# Patient Record
Sex: MALE | Race: WHITE
[De-identification: names, ages, dates, MRNs, and addresses within clinical notes are randomized per-mention and may not be internally consistent; named-entity substitution may affect disease eponyms.]

---

## 2018-09-16 ENCOUNTER — HOSPITAL ENCOUNTER (OUTPATIENT)
Dept: HOSPITAL 56 - MW.ED | Age: 58
Setting detail: OBSERVATION
Discharge: HOME | End: 2018-09-16
Attending: FAMILY MEDICINE | Admitting: FAMILY MEDICINE
Payer: COMMERCIAL

## 2018-09-16 DIAGNOSIS — F17.210: ICD-10-CM

## 2018-09-16 DIAGNOSIS — I10: Primary | ICD-10-CM

## 2018-09-16 DIAGNOSIS — Z79.899: ICD-10-CM

## 2018-09-16 DIAGNOSIS — R07.89: ICD-10-CM

## 2018-09-16 LAB
CHLORIDE SERPL-SCNC: 101 MMOL/L (ref 98–107)
SODIUM SERPL-SCNC: 138 MMOL/L (ref 136–148)

## 2018-09-16 PROCEDURE — 94640 AIRWAY INHALATION TREATMENT: CPT

## 2018-09-16 PROCEDURE — 82150 ASSAY OF AMYLASE: CPT

## 2018-09-16 PROCEDURE — G0378 HOSPITAL OBSERVATION PER HR: HCPCS

## 2018-09-16 PROCEDURE — 80053 COMPREHEN METABOLIC PANEL: CPT

## 2018-09-16 PROCEDURE — 84484 ASSAY OF TROPONIN QUANT: CPT

## 2018-09-16 PROCEDURE — 83735 ASSAY OF MAGNESIUM: CPT

## 2018-09-16 PROCEDURE — 96374 THER/PROPH/DIAG INJ IV PUSH: CPT

## 2018-09-16 PROCEDURE — 36415 COLL VENOUS BLD VENIPUNCTURE: CPT

## 2018-09-16 PROCEDURE — 96361 HYDRATE IV INFUSION ADD-ON: CPT

## 2018-09-16 PROCEDURE — 99285 EMERGENCY DEPT VISIT HI MDM: CPT

## 2018-09-16 PROCEDURE — 93005 ELECTROCARDIOGRAM TRACING: CPT

## 2018-09-16 PROCEDURE — 85610 PROTHROMBIN TIME: CPT

## 2018-09-16 PROCEDURE — 85025 COMPLETE CBC W/AUTO DIFF WBC: CPT

## 2018-09-16 PROCEDURE — 96375 TX/PRO/DX INJ NEW DRUG ADDON: CPT

## 2018-09-16 PROCEDURE — C9113 INJ PANTOPRAZOLE SODIUM, VIA: HCPCS

## 2018-09-16 PROCEDURE — G0480 DRUG TEST DEF 1-7 CLASSES: HCPCS

## 2018-09-16 PROCEDURE — 83690 ASSAY OF LIPASE: CPT

## 2018-09-16 PROCEDURE — 71045 X-RAY EXAM CHEST 1 VIEW: CPT

## 2018-09-16 NOTE — PCM.HP
H&P History of Present Illness





- General


Date of Service: 09/16/18


Admit Problem/Dx: 


 Admission Diagnosis/Problem





Admission Diagnosis/Problem      Dyspnea











- History of Present Illness


Initial Comments - Free Text/Narative: 





He was seen in the ED with cough and some chest pain. He states that he is much 

better now


EKG in the ED showed sinus rhythm with RBBB with T wave flattening in lead II 

and with flipped T waves in III and AVF





He wants to go home.





  ** head


Pain Score (Numeric/FACES): 6





- Related Data


Allergies/Adverse Reactions: 


 Allergies











Allergy/AdvReac Type Severity Reaction Status Date / Time


 


No Known Allergies Allergy   Verified 09/16/18 01:49











Home Medications: 


 Home Meds





Hydrochlorothiazide [Microzide] 0 mg PO DAILY 09/16/18 [History]


Hydrocodone/Acetaminophen [Hydrocodon-Acetaminoph 7.5-325] 0 mg PO ASDIRECTED 09 /16/18 [History]


Lisinopril 20 mg PO DAILY 09/16/18 [History]


Metoprolol Succinate [Kapspargo Sprinkle] 100 mg PO DAILY 09/16/18 [History]











Past Medical History


Cardiovascular History: Reports: Hypertension.  Denies: Afib, Cardiomyopathy, MI


Respiratory History: Denies: Asthma, COPD


Musculoskeletal History: Reports: Back Pain, Chronic


Endocrine/Metabolic History: Denies: Levy's Disease, Diabetes, Type I, 

Diabetes, Type II


Oncologic (Cancer) History: Reports: None





Social & Family History





- Family History


Family Medical History: Noncontributory





- Tobacco Use


Smoking Status *Q: Current Every Day Smoker


Years of Tobacco use: 45


Packs/Tins Daily: 1.5





- Caffeine Use


Caffeine Use: Reports: Coffee





- Recreational Drug Use


Recreational Drug Use: No





H&P Review of Systems





- Review of Systems:


Review Of Systems: See Below


Pulmonary: Reports: Shortness of Breath, Cough


Cardiovascular: Reports: Chest Pain


Gastrointestinal: Denies: Bloody Stool, Hematemesis


Genitourinary: Denies: Hematuria





Exam





- Exam


Exam: See Below





- Vital Signs


Vital Signs: 


 Last Vital Signs











Temp  97.4 F   09/16/18 11:30


 


Pulse  97   09/16/18 11:30


 


Resp  16   09/16/18 11:30


 


BP  171/95 H  09/16/18 11:30


 


Pulse Ox  97   09/16/18 11:30











Weight: 143.335 kg





- Exam


General: Alert, Oriented


HEENT: Conjunctiva Clear


Neck: Trachea Midline


Lungs: Clear to Auscultation, Normal Respiratory Effort


Cardiovascular: Regular Rate, Regular Rhythm


GI/Abdominal Exam: Soft


Rectal (Males) Exam: Deferred


Extremities: No Pedal Edema


Neurological: Cranial Nerves Intact, Normal Speech





- Patient Data


Lab Results Last 24 hrs: 


 Laboratory Results - last 24 hr











  09/16/18 09/16/18 09/16/18 Range/Units





  01:45 01:45 01:45 


 


WBC  6.34    (4.0-11.0)  K/uL


 


RBC  5.15    (4.50-5.90)  M/uL


 


Hgb  15.8    (13.0-17.0)  g/dL


 


Hct  44.5    (38.0-50.0)  %


 


MCV  86.4    (80.0-98.0)  fL


 


MCH  30.7    (27.0-32.0)  pg


 


MCHC  35.5    (31.0-37.0)  g/dL


 


RDW Std Deviation  39.7    (28.0-62.0)  fl


 


RDW Coeff of Josefa  13    (11.0-15.0)  %


 


Plt Count  226    (150-400)  K/uL


 


MPV  10.00    (7.40-12.00)  fL


 


Neut % (Auto)  49.5    (48.0-80.0)  %


 


Lymph % (Auto)  34.5    (16.0-40.0)  %


 


Mono % (Auto)  10.6    (0.0-15.0)  %


 


Eos % (Auto)  4.9    (0.0-7.0)  %


 


Baso % (Auto)  0.5    (0.0-1.5)  %


 


Neut # (Auto)  3.1    (1.4-5.7)  K/uL


 


Lymph # (Auto)  2.2    (0.6-2.4)  K/uL


 


Mono # (Auto)  0.7    (0.0-0.8)  K/uL


 


Eos # (Auto)  0.3    (0.0-0.7)  K/uL


 


Baso # (Auto)  0.0    (0.0-0.1)  K/uL


 


INR   0.98   


 


Sodium    138  (136-148)  mmol/L


 


Potassium    4.1  (3.5-5.1)  mmol/L


 


Chloride    101  ()  mmol/L


 


Carbon Dioxide    27.7  (21.0-32.0)  mmol/L


 


BUN    11  (7.0-18.0)  mg/dL


 


Creatinine    1.0  (0.8-1.3)  mg/dL


 


Est Cr Clr Drug Dosing    97.41  mL/min


 


Estimated GFR (MDRD)    > 60.0  ml/min


 


Glucose    136 H  ()  mg/dL


 


Calcium    9.1  (8.5-10.1)  mg/dL


 


Magnesium     (1.8-2.4)  mg/dL


 


Total Bilirubin    0.4  (0.2-1.0)  mg/dL


 


AST    17  (15-37)  IU/L


 


ALT    34  (14-63)  IU/L


 


Alkaline Phosphatase    86  ()  U/L


 


Troponin I    < 0.050  (0.000-0.056)  ng/mL


 


Total Protein    7.4  (6.4-8.2)  g/dL


 


Albumin    4.1  (3.4-5.0)  g/dL


 


Globulin    3.3  (2.0-3.5)  g/dL


 


Albumin/Globulin Ratio    1.2 L  (1.3-2.8)  


 


Amylase    42  ()  U/L


 


Lipase    148  ()  U/L


 


Ethyl Alcohol     mg/dL














  09/16/18 09/16/18 Range/Units





  01:45 07:50 


 


WBC    (4.0-11.0)  K/uL


 


RBC    (4.50-5.90)  M/uL


 


Hgb    (13.0-17.0)  g/dL


 


Hct    (38.0-50.0)  %


 


MCV    (80.0-98.0)  fL


 


MCH    (27.0-32.0)  pg


 


MCHC    (31.0-37.0)  g/dL


 


RDW Std Deviation    (28.0-62.0)  fl


 


RDW Coeff of Josefa    (11.0-15.0)  %


 


Plt Count    (150-400)  K/uL


 


MPV    (7.40-12.00)  fL


 


Neut % (Auto)    (48.0-80.0)  %


 


Lymph % (Auto)    (16.0-40.0)  %


 


Mono % (Auto)    (0.0-15.0)  %


 


Eos % (Auto)    (0.0-7.0)  %


 


Baso % (Auto)    (0.0-1.5)  %


 


Neut # (Auto)    (1.4-5.7)  K/uL


 


Lymph # (Auto)    (0.6-2.4)  K/uL


 


Mono # (Auto)    (0.0-0.8)  K/uL


 


Eos # (Auto)    (0.0-0.7)  K/uL


 


Baso # (Auto)    (0.0-0.1)  K/uL


 


INR    


 


Sodium    (136-148)  mmol/L


 


Potassium    (3.5-5.1)  mmol/L


 


Chloride    ()  mmol/L


 


Carbon Dioxide    (21.0-32.0)  mmol/L


 


BUN    (7.0-18.0)  mg/dL


 


Creatinine    (0.8-1.3)  mg/dL


 


Est Cr Clr Drug Dosing    mL/min


 


Estimated GFR (MDRD)    ml/min


 


Glucose    ()  mg/dL


 


Calcium    (8.5-10.1)  mg/dL


 


Magnesium  1.8   (1.8-2.4)  mg/dL


 


Total Bilirubin    (0.2-1.0)  mg/dL


 


AST    (15-37)  IU/L


 


ALT    (14-63)  IU/L


 


Alkaline Phosphatase    ()  U/L


 


Troponin I   < 0.050  (0.000-0.056)  ng/mL


 


Total Protein    (6.4-8.2)  g/dL


 


Albumin    (3.4-5.0)  g/dL


 


Globulin    (2.0-3.5)  g/dL


 


Albumin/Globulin Ratio    (1.3-2.8)  


 


Amylase    ()  U/L


 


Lipase    ()  U/L


 


Ethyl Alcohol  26   mg/dL











Result Diagrams: 


 09/16/18 01:45





 09/16/18 01:45





- Problem List


(1) Poorly-controlled hypertension


SNOMED Code(s): 675431798


   ICD Code: I10 - ESSENTIAL (PRIMARY) HYPERTENSION   Status: Acute   Current 

Visit: No   


Problem List Initiated/Reviewed/Updated: Yes


Orders Last 24hrs: 


 Active Orders 24 hr











 Category Date Time Status


 


 Patient Status [ADT] Stat ADT  09/16/18 03:21 Active


 


 Cardiac Monitoring [RC] Q8H Care  09/16/18 01:20 Active


 


 EKG Documentation Completion [RC] STAT Care  09/16/18 01:20 Active


 


 Oxygen Therapy, ED [RC] ASDIRECTED Care  09/16/18 01:20 Active


 


 Pulse Oximetry [RC] ASDIRECTED Care  09/16/18 01:20 Active


 


 RT Aerosol Therapy [RC] ASDIRECTED Care  09/16/18 01:43 Active


 


 2 Gram Sodium Diet [DIET] Diet  09/16/18 Breakfast Active


 


 Chest 1V Frontal [CR] Stat Exams  09/16/18 01:43 Taken


 


 TROPONIN I [CHEM] Routine Lab  09/16/18 13:45 Ordered


 


 Lisinopril [Prinivil] Med  09/16/18 10:30 Active





 20 mg PO DAILY   


 


 Metoprolol Succinate [Toprol XL] Med  09/16/18 10:15 Active





 100 mg PO DAILY   


 


 Sodium Chloride 0.9% [Saline Flush] Med  09/16/18 01:19 Active





 10 ml FLUSH ASDIRECTED PRN   


 


 Sodium Chloride 0.9% [Saline Flush] Med  09/16/18 01:19 Active





 2.5 ml FLUSH ASDIRECTED PRN   


 


 Saline Lock Insert [OM.PC] Stat Oth  09/16/18 01:20 Ordered








 Medication Orders





Lisinopril (Prinivil)  20 mg PO DAILY [facility]


   Last Admin: 09/16/18 10:27  Dose: 20 mg


Metoprolol Succinate (Toprol Xl)  100 mg PO DAILY [facility]


   Last Admin: 09/16/18 10:19  Dose: 100 mg


Sodium Chloride (Saline Flush)  10 ml FLUSH ASDIRECTED PRN


   PRN Reason: Keep Vein Open


Sodium Chloride (Saline Flush)  2.5 ml FLUSH ASDIRECTED PRN


   PRN Reason: Keep Vein Open








Assessment/Plan Comment:: 





observe 


telemetry

## 2018-09-16 NOTE — EDM.PDOC
ED HPI GENERAL MEDICAL PROBLEM





- General


Chief Complaint: Cardiovascular Problem


Stated Complaint: SHORTNESS OF BREATH


Time Seen by Provider: 09/16/18 01:36





- History of Present Illness


INITIAL COMMENTS - FREE TEXT/NARRATIVE: 





HISTORY AND PHYSICAL:





History of present illness:


The patient is a 58-year-old male with a history of hypertension as well as a 

long-standing smoking history, 1-1/2 packs per day for 45 years, but has no 

stated pulmonary history and presents with complaints of shortness of breath 

that has been ongoing all day yesterday and worsened this evening. The patient 

denies any fevers chills or upper respiratory symptoms prior to  today but says 

that all day he felt like he was short of breath with activities and with rest 

but he didn't have cough or any other symptoms. [place] and is not exposed to any chemicals or other airborne products that he 

is aware of. He says he usually lives in [place] and has been here over 2 

weeks. He says that when he got home after working he still felt short of 

breath does he did the day but tried to go to sleep and then at 10 PM he 

started having a dry hacking cough intermittently and more shortness of breath 

but no chest pain. He says that he decided to come in because about an hour ago 

he started having some indigestion which he doesn't normally have. He has no 

abdominal pain and he has no vomiting or nausea and she's had no diarrhea or 

black or bloody stools. Patient denies any GI history. Currently in the ED he 

feels that he can take a deep breath but he says he just feels like he is not 

getting enough oxygen. He again denies any chest pain no extremity pain no leg 

swelling and no history of congestive heart failure. He has no other cardiac 

history. He did not take any medications for shortness of breath or cough prior 

to coming here.


The patient tells me that when he sees his provider back at home for his blood 

pressure a good blood pressure for him is 160s over 90s. He doesn't usually get 

below that. He has no headache neck pain or back pain and has had no recent 

trauma.


Patient told nursing that he took his blood pressure at home and it was 190/114 

and he was concerned about that along with a dull diffuse headache. On my 

evaluation he says that he has a headache but he points to the top of his head 

and is not lateralizing right or left and it is diffuse.


Review of systems: 


As per history of present illness and below otherwise all systems reviewed and 

negative.





Past medical history: 


As per history of present illness and as reviewed below otherwise 

noncontributory.





Surgical history: 


As per history of present illness and as reviewed below otherwise 

noncontributory.





Social history: 


No reported history of drug or alcohol abuse.





Family history: 


As per history of present illness and as reviewed below otherwise 

noncontributory.





Physical exam:


General: Well-developed well-nourished mildly overweight man who is nontoxic 

and vital signs are reviewed by me. I did hear a hacking dry cough on my 

evaluation. Is not breathless with speech.


HEENT: Atraumatic, normocephalic, pupils reactive, negative for conjunctival 

pallor or scleral icterus, mucous membranes moist, throat clear, neck supple, 

nontender, trachea midline.


Lungs: Clear to auscultation, breath sounds equal bilaterally, chest nontender. 

There is no wheezing or stridor but there is some diminished breath sounds in 

the bases bilaterally and no work of breathing.


Heart: S1S2, regular rate and rhythm rhythm no overt murmurs


Abdomen: Soft, nondistended, nontender. Negative for masses or 

hepatosplenomegaly. NABS


Pelvis: Stable nontender.


Genitourinary: Deferred.


Rectal: Deferred.


Extremities: Atraumatic, negative for cords or calf pain. Neurovascular 

unremarkable. No pedal edema or leg asymmetry


Neuro: Awake, alert, oriented. Cranial nerves II through XII unremarkable. 

Cerebellum unremarkable. Motor and sensory unremarkable throughout. Exam 

nonfocal.


Skin: There is normal turgor and no evidence of any diaphoresis


Diagnostics:


EKG--nonspecific T-wave inversion in 2 of the inferior leads otherwise no acute 

changes


Portable chest x-ray CBC CMP amylase lipase INR troponin magnesium alcohol level





Therapeutics:


IV O2 monitor IV fluids duo neb Solu-Medrol aspirin nitro paste Protonix Tylenol





0250: Patient is most recent blood pressure is 144/78.


0305a: He says he is feeling much improved with all the medications and I've 

given him and I discussed with him all testing results and my recommendation 

for observation admission for the oddness of the symptoms without any 

definitive etiology and his episode of heartburn and hypertension. He is 

agreeable and I will discuss this case with the hospitalist Dr Gorman


0325: Case discussed with Dr. Gorman who accepts the patient for admission.


Impression: 


Dyspnea, hypertension poorly controlled, atypical chest pain rule out anginal 

equivalent





Definitive disposition and diagnosis as appropriate pending reevaluation and 

review of above.


  ** head


Pain Score (Numeric/FACES): 6





- Related Data


 Allergies











Allergy/AdvReac Type Severity Reaction Status Date / Time


 


No Known Allergies Allergy   Verified 09/16/18 01:49











Home Meds: 


 Home Meds





Hydrochlorothiazide [Microzide] 0 mg PO DAILY 09/16/18 [History]


Hydrocodone/Acetaminophen [Hydrocodon-Acetaminoph 7.5-325] 0 mg PO ASDIRECTED 09 /16/18 [History]


Lisinopril 1 tab PO DAILY 09/16/18 [History]


Metoprolol Succinate [Kapspargo Sprinkle] 1 tab PO DAILY 09/16/18 [History]











ED ROS GENERAL





- Review of Systems


Review Of Systems: ROS reveals no pertinent complaints other than HPI.





ED EXAM, GENERAL





- Physical Exam


Exam: See Below (See dictation)





Course





- Vital Signs


Last Recorded V/S: 


 Last Vital Signs











Temp  36.7 C   09/16/18 03:09


 


Pulse  66   09/16/18 03:09


 


Resp  18   09/16/18 03:09


 


BP  157/84 H  09/16/18 03:09


 


Pulse Ox  97   09/16/18 03:09














- Orders/Labs/Meds


Orders: 


 Active Orders 24 hr











 Category Date Time Status


 


 Patient Status [ADT] Stat ADT  09/16/18 03:21 Active


 


 Cardiac Monitoring [RC] .AS DIRECTED Care  09/16/18 01:20 Active


 


 EKG Documentation Completion [RC] STAT Care  09/16/18 01:20 Active


 


 Oxygen Therapy, ED [RC] ASDIRECTED Care  09/16/18 01:20 Active


 


 Pulse Oximetry [RC] ASDIRECTED Care  09/16/18 01:20 Active


 


 RT Aerosol Therapy [RC] ASDIRECTED Care  09/16/18 01:43 Active


 


 Chest 1V Frontal [CR] Stat Exams  09/16/18 01:43 Taken


 


 Sodium Chloride 0.9% [Saline Flush] Med  09/16/18 01:19 Active





 10 ml FLUSH ASDIRECTED PRN   


 


 Sodium Chloride 0.9% [Saline Flush] Med  09/16/18 01:19 Active





 2.5 ml FLUSH ASDIRECTED PRN   


 


 Saline Lock Insert [OM.PC] Stat Oth  09/16/18 01:20 Ordered








 Medication Orders





Sodium Chloride (Saline Flush)  10 ml FLUSH ASDIRECTED PRN


   PRN Reason: Keep Vein Open


Sodium Chloride (Saline Flush)  2.5 ml FLUSH ASDIRECTED PRN


   PRN Reason: Keep Vein Open








Labs: 


 Laboratory Tests











  09/16/18 09/16/18 09/16/18 Range/Units





  01:45 01:45 01:45 


 


WBC  6.34    (4.0-11.0)  K/uL


 


RBC  5.15    (4.50-5.90)  M/uL


 


Hgb  15.8    (13.0-17.0)  g/dL


 


Hct  44.5    (38.0-50.0)  %


 


MCV  86.4    (80.0-98.0)  fL


 


MCH  30.7    (27.0-32.0)  pg


 


MCHC  35.5    (31.0-37.0)  g/dL


 


RDW Std Deviation  39.7    (28.0-62.0)  fl


 


RDW Coeff of Josefa  13    (11.0-15.0)  %


 


Plt Count  226    (150-400)  K/uL


 


MPV  10.00    (7.40-12.00)  fL


 


Neut % (Auto)  49.5    (48.0-80.0)  %


 


Lymph % (Auto)  34.5    (16.0-40.0)  %


 


Mono % (Auto)  10.6    (0.0-15.0)  %


 


Eos % (Auto)  4.9    (0.0-7.0)  %


 


Baso % (Auto)  0.5    (0.0-1.5)  %


 


Neut # (Auto)  3.1    (1.4-5.7)  K/uL


 


Lymph # (Auto)  2.2    (0.6-2.4)  K/uL


 


Mono # (Auto)  0.7    (0.0-0.8)  K/uL


 


Eos # (Auto)  0.3    (0.0-0.7)  K/uL


 


Baso # (Auto)  0.0    (0.0-0.1)  K/uL


 


INR   0.98   


 


Sodium    138  (136-148)  mmol/L


 


Potassium    4.1  (3.5-5.1)  mmol/L


 


Chloride    101  ()  mmol/L


 


Carbon Dioxide    27.7  (21.0-32.0)  mmol/L


 


BUN    11  (7.0-18.0)  mg/dL


 


Creatinine    1.0  (0.8-1.3)  mg/dL


 


Est Cr Clr Drug Dosing    97.41  mL/min


 


Estimated GFR (MDRD)    > 60.0  ml/min


 


Glucose    136 H  ()  mg/dL


 


Calcium    9.1  (8.5-10.1)  mg/dL


 


Magnesium     (1.8-2.4)  mg/dL


 


Total Bilirubin    0.4  (0.2-1.0)  mg/dL


 


AST    17  (15-37)  IU/L


 


ALT    34  (14-63)  IU/L


 


Alkaline Phosphatase    86  ()  U/L


 


Troponin I    < 0.050  (0.000-0.056)  ng/mL


 


Total Protein    7.4  (6.4-8.2)  g/dL


 


Albumin    4.1  (3.4-5.0)  g/dL


 


Globulin    3.3  (2.0-3.5)  g/dL


 


Albumin/Globulin Ratio    1.2 L  (1.3-2.8)  


 


Amylase    42  ()  U/L


 


Lipase    148  ()  U/L


 


Ethyl Alcohol     mg/dL














  09/16/18 Range/Units





  01:45 


 


WBC   (4.0-11.0)  K/uL


 


RBC   (4.50-5.90)  M/uL


 


Hgb   (13.0-17.0)  g/dL


 


Hct   (38.0-50.0)  %


 


MCV   (80.0-98.0)  fL


 


MCH   (27.0-32.0)  pg


 


MCHC   (31.0-37.0)  g/dL


 


RDW Std Deviation   (28.0-62.0)  fl


 


RDW Coeff of Josefa   (11.0-15.0)  %


 


Plt Count   (150-400)  K/uL


 


MPV   (7.40-12.00)  fL


 


Neut % (Auto)   (48.0-80.0)  %


 


Lymph % (Auto)   (16.0-40.0)  %


 


Mono % (Auto)   (0.0-15.0)  %


 


Eos % (Auto)   (0.0-7.0)  %


 


Baso % (Auto)   (0.0-1.5)  %


 


Neut # (Auto)   (1.4-5.7)  K/uL


 


Lymph # (Auto)   (0.6-2.4)  K/uL


 


Mono # (Auto)   (0.0-0.8)  K/uL


 


Eos # (Auto)   (0.0-0.7)  K/uL


 


Baso # (Auto)   (0.0-0.1)  K/uL


 


INR   


 


Sodium   (136-148)  mmol/L


 


Potassium   (3.5-5.1)  mmol/L


 


Chloride   ()  mmol/L


 


Carbon Dioxide   (21.0-32.0)  mmol/L


 


BUN   (7.0-18.0)  mg/dL


 


Creatinine   (0.8-1.3)  mg/dL


 


Est Cr Clr Drug Dosing   mL/min


 


Estimated GFR (MDRD)   ml/min


 


Glucose   ()  mg/dL


 


Calcium   (8.5-10.1)  mg/dL


 


Magnesium  1.8  (1.8-2.4)  mg/dL


 


Total Bilirubin   (0.2-1.0)  mg/dL


 


AST   (15-37)  IU/L


 


ALT   (14-63)  IU/L


 


Alkaline Phosphatase   ()  U/L


 


Troponin I   (0.000-0.056)  ng/mL


 


Total Protein   (6.4-8.2)  g/dL


 


Albumin   (3.4-5.0)  g/dL


 


Globulin   (2.0-3.5)  g/dL


 


Albumin/Globulin Ratio   (1.3-2.8)  


 


Amylase   ()  U/L


 


Lipase   ()  U/L


 


Ethyl Alcohol  26  mg/dL











Meds: 


Medications











Generic Name Dose Route Start Last Admin





  Trade Name Freq  PRN Reason Stop Dose Admin


 


Sodium Chloride  10 ml  09/16/18 01:19  





  Saline Flush  FLUSH   





  ASDIRECTED PRN   





  Keep Vein Open   





     





     





     


 


Sodium Chloride  2.5 ml  09/16/18 01:19  





  Saline Flush  FLUSH   





  ASDIRECTED PRN   





  Keep Vein Open   





     





     





     














Discontinued Medications














Generic Name Dose Route Start Last Admin





  Trade Name Freq  PRN Reason Stop Dose Admin


 


Acetaminophen  1,000 mg  09/16/18 02:04  09/16/18 02:12





  Tylenol Extra Strength  PO  09/16/18 02:05  1,000 mg





  ONETIME ONE   Administration





     





     





     





     


 


Albuterol/Ipratropium  3 ml  09/16/18 01:42  09/16/18 01:55





  Duoneb 3.0-0.5 Mg/3 Ml  NEB  09/16/18 01:43  3 ml





  ONETIME ONE   Administration





     





     





     





     


 


Aspirin  324 mg  09/16/18 01:42  09/16/18 01:54





  Aspirin  PO  09/16/18 01:43  324 mg





  ONETIME ONE   Administration





     





     





     





     


 


Sodium Chloride  1,000 mls @ 999 mls/hr  09/16/18 01:43  09/16/18 01:54





  Normal Saline  IV  09/16/18 02:43  999 mls/hr





  STAT ONE   Administration





     





     





     





     


 


Methylprednisolone Sodium Succinate  125 mg  09/16/18 01:43  09/16/18 01:55





  Solu-Medrol  IVPUSH  09/16/18 01:44  125 mg





  ONETIME ONE   Administration





     





     





     





     


 


Nitroglycerin  1 gm  09/16/18 01:42  09/16/18 01:55





  Nitro-Bid 2%  TOP  09/16/18 01:43  1 gm





  ONETIME ONE   Administration





     





     





     





     


 


Pantoprazole Sodium  80 mg  09/16/18 01:43  09/16/18 01:55





  Protonix Iv***  IVPUSH  09/16/18 01:44  80 mg





  .BOLUS ONE   Administration





     





     





     





     














Departure





- Departure


Time of Disposition: 03:37


Disposition: Refer to Observation


Reason for Transfer *Q: Primary PCI Indicated


Condition: Good


Clinical Impression: 


 Dyspnea, Anginal equivalent, Poorly-controlled hypertension





Forms:  ED Department Discharge





- My Orders


Last 24 Hours: 


My Active Orders





09/16/18 01:19


Sodium Chloride 0.9% [Saline Flush]   10 ml FLUSH ASDIRECTED PRN 


Sodium Chloride 0.9% [Saline Flush]   2.5 ml FLUSH ASDIRECTED PRN 





09/16/18 01:20


Cardiac Monitoring [RC] .AS DIRECTED 


EKG Documentation Completion [RC] STAT 


Oxygen Therapy, ED [RC] ASDIRECTED 


Pulse Oximetry [RC] ASDIRECTED 


Saline Lock Insert [OM.PC] Stat 





09/16/18 01:43


RT Aerosol Therapy [RC] ASDIRECTED 


Chest 1V Frontal [CR] Stat 





09/16/18 03:21


Patient Status [ADT] Stat 














- Assessment/Plan


Last 24 Hours: 


My Active Orders





09/16/18 01:19


Sodium Chloride 0.9% [Saline Flush]   10 ml FLUSH ASDIRECTED PRN 


Sodium Chloride 0.9% [Saline Flush]   2.5 ml FLUSH ASDIRECTED PRN 





09/16/18 01:20


Cardiac Monitoring [RC] .AS DIRECTED 


EKG Documentation Completion [RC] STAT 


Oxygen Therapy, ED [RC] ASDIRECTED 


Pulse Oximetry [RC] ASDIRECTED 


Saline Lock Insert [OM.PC] Stat 





09/16/18 01:43


RT Aerosol Therapy [RC] ASDIRECTED 


Chest 1V Frontal [CR] Stat 





09/16/18 03:21


Patient Status [ADT] Stat

## 2018-09-16 NOTE — [id number]
**Discharge Summary





- Hospital Course


Free Text/Narrative:: 





He was admitted for several troponins for chest pain and cough.





Diagnosis: Stroke: No





- Discharge Data


Discharge Date: 09/16/18


Discharge Disposition: Home, Self-Care 01


Condition: Good





- Discharge Diagnosis/Problem(s)


(1) Poorly-controlled hypertension


SNOMED Code(s): 372824414


   ICD Code: I10 - ESSENTIAL (PRIMARY) HYPERTENSION   Status: Acute   Current 

Visit: No   





- Patient Summary/Data


Hospital Course: 





His serial troponins were normal.


His cough improved and he feels improved.


He remained hypertensive during the time in the hospital. 


AT discharge his lungs are clear to auscultation.





He wants to follow up when he goes home to [place].


I recommended consideration of heart stress testing when he goes home


I also recommended increasing his antihypertensives to:


lisinopril 20 mg bid 


HCTZ 12.5 mg bid





Will discharge on azithromymycin ( z pack)





- Discharge Plan


Home Medications: 


 Home Meds





Hydrochlorothiazide [Microzide] 0 mg PO DAILY 09/16/18 [History]


Hydrocodone/Acetaminophen [Hydrocodon-Acetaminoph 7.5-325] 0 mg PO ASDIRECTED 09 /16/18 [History]


Lisinopril 20 mg PO DAILY 09/16/18 [History]


Metoprolol Succinate [Kapspargo Sprinkle] 100 mg PO DAILY 09/16/18 [History]








Forms:  ED Department Discharge


Referrals: 


PCP,None [Primary Care Provider] - 





- Patient Data


Vitals - Most Recent: 


 Last Vital Signs











Temp  97.4 F   09/16/18 11:30


 


Pulse  97   09/16/18 11:30


 


Resp  16   09/16/18 11:30


 


BP  171/95 H  09/16/18 11:30


 


Pulse Ox  97   09/16/18 11:30











Weight - Most Recent: 143.335 kg


Lab Results - Last 24 hrs: 


 Laboratory Results - last 24 hr











  09/16/18 09/16/18 09/16/18 Range/Units





  01:45 01:45 01:45 


 


WBC  6.34    (4.0-11.0)  K/uL


 


RBC  5.15    (4.50-5.90)  M/uL


 


Hgb  15.8    (13.0-17.0)  g/dL


 


Hct  44.5    (38.0-50.0)  %


 


MCV  86.4    (80.0-98.0)  fL


 


MCH  30.7    (27.0-32.0)  pg


 


MCHC  35.5    (31.0-37.0)  g/dL


 


RDW Std Deviation  39.7    (28.0-62.0)  fl


 


RDW Coeff of Josefa  13    (11.0-15.0)  %


 


Plt Count  226    (150-400)  K/uL


 


MPV  10.00    (7.40-12.00)  fL


 


Neut % (Auto)  49.5    (48.0-80.0)  %


 


Lymph % (Auto)  34.5    (16.0-40.0)  %


 


Mono % (Auto)  10.6    (0.0-15.0)  %


 


Eos % (Auto)  4.9    (0.0-7.0)  %


 


Baso % (Auto)  0.5    (0.0-1.5)  %


 


Neut # (Auto)  3.1    (1.4-5.7)  K/uL


 


Lymph # (Auto)  2.2    (0.6-2.4)  K/uL


 


Mono # (Auto)  0.7    (0.0-0.8)  K/uL


 


Eos # (Auto)  0.3    (0.0-0.7)  K/uL


 


Baso # (Auto)  0.0    (0.0-0.1)  K/uL


 


INR   0.98   


 


Sodium    138  (136-148)  mmol/L


 


Potassium    4.1  (3.5-5.1)  mmol/L


 


Chloride    101  ()  mmol/L


 


Carbon Dioxide    27.7  (21.0-32.0)  mmol/L


 


BUN    11  (7.0-18.0)  mg/dL


 


Creatinine    1.0  (0.8-1.3)  mg/dL


 


Est Cr Clr Drug Dosing    97.41  mL/min


 


Estimated GFR (MDRD)    > 60.0  ml/min


 


Glucose    136 H  ()  mg/dL


 


Calcium    9.1  (8.5-10.1)  mg/dL


 


Magnesium     (1.8-2.4)  mg/dL


 


Total Bilirubin    0.4  (0.2-1.0)  mg/dL


 


AST    17  (15-37)  IU/L


 


ALT    34  (14-63)  IU/L


 


Alkaline Phosphatase    86  ()  U/L


 


Troponin I    < 0.050  (0.000-0.056)  ng/mL


 


Total Protein    7.4  (6.4-8.2)  g/dL


 


Albumin    4.1  (3.4-5.0)  g/dL


 


Globulin    3.3  (2.0-3.5)  g/dL


 


Albumin/Globulin Ratio    1.2 L  (1.3-2.8)  


 


Amylase    42  ()  U/L


 


Lipase    148  ()  U/L


 


Ethyl Alcohol     mg/dL














  09/16/18 09/16/18 Range/Units





  01:45 07:50 


 


WBC    (4.0-11.0)  K/uL


 


RBC    (4.50-5.90)  M/uL


 


Hgb    (13.0-17.0)  g/dL


 


Hct    (38.0-50.0)  %


 


MCV    (80.0-98.0)  fL


 


MCH    (27.0-32.0)  pg


 


MCHC    (31.0-37.0)  g/dL


 


RDW Std Deviation    (28.0-62.0)  fl


 


RDW Coeff of Josefa    (11.0-15.0)  %


 


Plt Count    (150-400)  K/uL


 


MPV    (7.40-12.00)  fL


 


Neut % (Auto)    (48.0-80.0)  %


 


Lymph % (Auto)    (16.0-40.0)  %


 


Mono % (Auto)    (0.0-15.0)  %


 


Eos % (Auto)    (0.0-7.0)  %


 


Baso % (Auto)    (0.0-1.5)  %


 


Neut # (Auto)    (1.4-5.7)  K/uL


 


Lymph # (Auto)    (0.6-2.4)  K/uL


 


Mono # (Auto)    (0.0-0.8)  K/uL


 


Eos # (Auto)    (0.0-0.7)  K/uL


 


Baso # (Auto)    (0.0-0.1)  K/uL


 


INR    


 


Sodium    (136-148)  mmol/L


 


Potassium    (3.5-5.1)  mmol/L


 


Chloride    ()  mmol/L


 


Carbon Dioxide    (21.0-32.0)  mmol/L


 


BUN    (7.0-18.0)  mg/dL


 


Creatinine    (0.8-1.3)  mg/dL


 


Est Cr Clr Drug Dosing    mL/min


 


Estimated GFR (MDRD)    ml/min


 


Glucose    ()  mg/dL


 


Calcium    (8.5-10.1)  mg/dL


 


Magnesium  1.8   (1.8-2.4)  mg/dL


 


Total Bilirubin    (0.2-1.0)  mg/dL


 


AST    (15-37)  IU/L


 


ALT    (14-63)  IU/L


 


Alkaline Phosphatase    ()  U/L


 


Troponin I   < 0.050  (0.000-0.056)  ng/mL


 


Total Protein    (6.4-8.2)  g/dL


 


Albumin    (3.4-5.0)  g/dL


 


Globulin    (2.0-3.5)  g/dL


 


Albumin/Globulin Ratio    (1.3-2.8)  


 


Amylase    ()  U/L


 


Lipase    ()  U/L


 


Ethyl Alcohol  26   mg/dL











Med Orders - Current: 


 Current Medications





Lisinopril (Prinivil)  20 mg PO DAILY [facility]


   Last Admin: 09/16/18 10:27 Dose:  20 mg


Metoprolol Succinate (Toprol Xl)  100 mg PO DAILY [facility]


   Last Admin: 09/16/18 10:19 Dose:  100 mg


Sodium Chloride (Saline Flush)  10 ml FLUSH ASDIRECTED PRN


   PRN Reason: Keep Vein Open


Sodium Chloride (Saline Flush)  2.5 ml FLUSH ASDIRECTED PRN


   PRN Reason: Keep Vein Open





Discontinued Medications





Acetaminophen (Tylenol Extra Strength)  1,000 mg PO ONETIME ONE


   Stop: 09/16/18 02:05


   Last Admin: 09/16/18 02:12 Dose:  1,000 mg


Albuterol/Ipratropium (Duoneb 3.0-0.5 Mg/3 Ml)  3 ml NEB ONETIME ONE


   Stop: 09/16/18 01:43


   Last Admin: 09/16/18 01:55 Dose:  3 ml


Aspirin (Aspirin)  324 mg PO ONETIME ONE


   Stop: 09/16/18 01:43


   Last Admin: 09/16/18 01:54 Dose:  324 mg


Sodium Chloride (Normal Saline)  1,000 mls @ 999 mls/hr IV STAT ONE


   Stop: 09/16/18 02:43


   Last Admin: 09/16/18 01:54 Dose:  999 mls/hr


Lisinopril (Prinivil)  10 mg PO DAILY [facility]


Lisinopril (Prinivil)  20 mg PO DAILY [facility]


Methylprednisolone Sodium Succinate (Solu-Medrol)  125 mg IVPUSH ONETIME ONE


   Stop: 09/16/18 01:44


   Last Admin: 09/16/18 01:55 Dose:  125 mg


Nitroglycerin (Nitro-Bid 2%)  1 gm TOP ONETIME ONE


   Stop: 09/16/18 01:43


   Last Admin: 09/16/18 01:55 Dose:  1 gm


Pantoprazole Sodium (Protonix Iv***)  80 mg IVPUSH .BOLUS ONE


   Stop: 09/16/18 01:44


   Last Admin: 09/16/18 01:55 Dose:  80 mg

## 2018-09-18 NOTE — CR
EXAM DATE: 18



PATIENT'S AGE: 58



Patient: [facility]



Facility: [place], [place]

Patient ID: [id number]

Site Patient ID: [id number].

Site Accession #: [id number]

: 1960

Study: XRay Chest [id number]/16/2018 2:10:56 AM

Ordering Physician: Payal Ortega



Final Report: 

HISTORY:

Pain and shortness of breath 



COMPARISON:

None available 



FINDINGS:

A portable erect AP view of the chest was obtained at 0205 hours. 



The lungs are clear. No focal or diffuse infiltrates are present. 



The heart is normal in size. 



The mediastinum is normal in appearance. 



The osseous structures are normal in appearance for the patient`s age. 



IMPRESSION:

NORMAL PORTABLE CHEST SINGLE VIEW.





Dictated by Bruno Macdonald MD @ Sep 16 2018 2:26AM

(Electronic Signature)







Report Signed by Proxy.
TRINH